# Patient Record
Sex: FEMALE | Race: OTHER
[De-identification: names, ages, dates, MRNs, and addresses within clinical notes are randomized per-mention and may not be internally consistent; named-entity substitution may affect disease eponyms.]

---

## 2021-03-16 ENCOUNTER — HOSPITAL ENCOUNTER (OUTPATIENT)
Dept: HOSPITAL 72 - GAS | Age: 51
Discharge: HOME | End: 2021-03-16
Payer: COMMERCIAL

## 2021-03-16 VITALS — DIASTOLIC BLOOD PRESSURE: 76 MMHG | SYSTOLIC BLOOD PRESSURE: 127 MMHG

## 2021-03-16 VITALS — SYSTOLIC BLOOD PRESSURE: 107 MMHG | DIASTOLIC BLOOD PRESSURE: 68 MMHG

## 2021-03-16 VITALS — SYSTOLIC BLOOD PRESSURE: 117 MMHG | DIASTOLIC BLOOD PRESSURE: 76 MMHG

## 2021-03-16 VITALS — DIASTOLIC BLOOD PRESSURE: 70 MMHG | SYSTOLIC BLOOD PRESSURE: 110 MMHG

## 2021-03-16 VITALS — WEIGHT: 186 LBS | BODY MASS INDEX: 31.76 KG/M2 | HEIGHT: 64 IN

## 2021-03-16 VITALS — DIASTOLIC BLOOD PRESSURE: 74 MMHG | SYSTOLIC BLOOD PRESSURE: 135 MMHG

## 2021-03-16 VITALS — DIASTOLIC BLOOD PRESSURE: 79 MMHG | SYSTOLIC BLOOD PRESSURE: 130 MMHG

## 2021-03-16 VITALS — DIASTOLIC BLOOD PRESSURE: 54 MMHG | SYSTOLIC BLOOD PRESSURE: 117 MMHG

## 2021-03-16 VITALS — SYSTOLIC BLOOD PRESSURE: 144 MMHG | DIASTOLIC BLOOD PRESSURE: 81 MMHG

## 2021-03-16 DIAGNOSIS — Z88.6: ICD-10-CM

## 2021-03-16 DIAGNOSIS — K31.7: Primary | ICD-10-CM

## 2021-03-16 DIAGNOSIS — K21.9: ICD-10-CM

## 2021-03-16 DIAGNOSIS — E66.9: ICD-10-CM

## 2021-03-16 DIAGNOSIS — I10: ICD-10-CM

## 2021-03-16 PROCEDURE — 94003 VENT MGMT INPAT SUBQ DAY: CPT

## 2021-03-16 PROCEDURE — 94150 VITAL CAPACITY TEST: CPT

## 2021-03-16 PROCEDURE — 43251 EGD REMOVE LESION SNARE: CPT

## 2021-03-16 NOTE — ANETHESIA PREOPERATIVE EVAL
Anesthesia Pre-op PMH/ROS


General


Date of Evaluation:  Mar 16, 2021


Time of Evaluation:  09:17


Anesthesiologist:  Cyril


ASA Score:  ASA 3


Mallampati Score


Class I : Soft palate, uvula, fauces, pillars visible


Class II: Soft palate, uvula, fauces visible


Class III: Soft palate, base of uvula visible


Class IV: Only hard plate visible


Mallampati Classification:  Class II


Surgeon:  April


Diagnosis:  Abd Pain


Surgical Procedure:  EGD


Anesthesia History:  none


Family History:  no anesthesia problems


Allergies:  


Coded Allergies:  


     CODEINE (Verified  Allergy, Severe, rash, 3/16/21)


Medications:  see eMAR


Patient NPO?:  Yes





Past Medical History


Cardiovascular:  Reports: HTN


Pulmonary:  Reports: asthma


Gastrointestinal/Genitourinary:  Reports: GERD


Other:  obesity - BMI 33


PSxH Narrative:


L Knee Arthroscopy





Anesthesia Pre-op Phys. Exam


Physician Exam





Last Vital Signs








  Date Time  Temp Pulse Resp B/P (MAP) Pulse Ox O2 Delivery O2 Flow Rate FiO2


 


3/16/21 07:55      Room Air  


 


3/16/21 07:54 97.3 70 18 144/81 97   








Constitutional:  NAD


Neurologic:  CN 2-12 intact


Cardiovascular:  RRR


Respiratory:  CTA


Gastrointestinal:  S/NT/ND





Airway Exam


Mallampati Score:  Class II


MO:  full


ROM:  full


Teeth:  missing, intact





Anesthesia Pre-op A/P


Risk Assessment & Plan


Assessment:


ASA 3


Plan:


TIVA


Status Change Before Surgery:  No











Paul Nam MD                Mar 16, 2021 08:38

## 2021-03-16 NOTE — ENDOSCOPY PROCEDURE NOTE
Endoscopy Procedure Note


General


Indication for Procedure:  GERDs and abdominal pains


Procedures Performed:  EGD - evidence of gastric polyposis. Two polyps removed 

from mid body and prepyloric area. The mid body polyps was 1 CM and 

pedunclulated


Specimen:  yes


Pt Tolerated Procedure Well:  Yes


Estimated Blood Loss:  none





Anesthesia


Anesthesiologist:  Dr. Martin


Anesthesia:  moderate sedation





Medications


Medication Given:  see anesthesia record





Inserted Devices


Implant(s) used?:  No





Quality


Quality of Bowel Preparation:  Good


Was there any complications?:  No





GI Core Measures


50 yrs or older w/o bx or poly:  Not Applicable


10yrs. F/U recommended:  Not Applicable


If not recommended, why?:  


Med reason:<3 yrs.:  


System Reason:<3 yrs.:  











Joanne Chen MD                 Mar 16, 2021 09:42

## 2021-03-16 NOTE — OPERATIVE NOTE - DICTATED
DATE OF OPERATION:  03/16/2021

SURGEON:  Joanne Chen MD.



PROCEDURE:  Esophagogastroduodenoscopy with polypectomy and biopsy.



PREOPERATIVE DIAGNOSIS:  Abdominal pain, history of gastroesophageal acid

reflux.



POSTOPERATIVE DIAGNOSIS:  Evidence of gastric polyposis.  Two major polyps

were removed with hot snare, one located in the midbody posterior wall of

the stomach and size of 1 cm, pedunculated and the other one in the

pre-pyloric area, size of 5 mm, also pedunculated.  The gastric cavity

seems to be completely normal.  No evidence of gastritis noted.  Random

biopsy from gastric body obtained.



MEDICATION USED:  Per Dr. Nam anesthesiologist.



INSTRUMENT:  GIF Olympus video upper GI endoscope.



DESCRIPTION OF PROCEDURE:  The patient after arriving endoscopy unit, was

told about risks and benefits of the procedure, which he accepted and

signed informed consent.  He was then put on the left lateral decubitus

position.  After adequate IV sedation, the scope was gently passed through

the cricopharyngeal area, was lodged into the upper esophagus and was

gradually advanced towards gastroesophageal junction.  The entire length

of the esophagus was normal.  GE junction also looked normal.  No evidence

of Reid's or hiatal hernia.



At this time, the scope was advanced into the stomach and the gastric

cavity was distended with insufflation of air.  Immediately, it was seen

that there was rather a large pedunculated polyp located over the midbody

posterior wall greater curvature side of the stomach.  It looked benign

however it was multilobulated and it was grabbed with a snare cautery

forceps and totally coagulated and removed and the polypectomy did not

reveal major bleeding either.  At this time, the scope was gradually

advanced towards the rest of the stomach into the antrum where there was

another approximately 4 to 5 mm polypoid lesion seen in the pre-pyloric

section at the level of 2 o'clock.  This was again grabbed with a snare

cautery forceps and totally removed with hot snare and all the specimen

was sent to the pathology lab.  The rest of the stomach looked normal and

there was no any evidence of gastritis or ulcers, etc.  Subsequently, the

scope was passed through the pylorus.  First and second portion of

duodenum were examined that they looked completely normal.  At this time,

the scope was pulled out and the procedure was terminated.  The patient

tolerated the procedure well and left the endoscopy room in a good

condition.









  ______________________________________________

  Said ARABELLA Chen





DR:  SEJAL

D:  03/16/2021 09:47

T:  03/16/2021 10:35

JOB#:  [id number]

CC:

## 2021-03-16 NOTE — 48 HOUR POST ANESTHESIA EVAL
Post Anesthesia Evaluation


Procedure:  EGD


Date of Evaluation:  Mar 16, 2021


Time of Evaluation:  12:12


Blood Pressure Systolic:  139


0:  69


Pulse Rate:  66


Respiratory Rate:  18


Temperature (Fahrenheit):  98.6


O2 Sat by Pulse Oximetry:  100


Airway:  patent


Nausea:  No


Vomiting:  No


Pain Intensity:  1


Hydration Status:  adequate


Cardiopulmonary Status:


Stable


Mental Status/LOC:  patient returned to baseline


Follow-up Care/Observations:


0


Post-Anesthesia Complications:


0


Follow-up care needed:  ready to discharge











Paul Nam MD                Mar 16, 2021 08:39

## 2021-03-16 NOTE — IMMEDIATE POST-OP EVALUATION
Immediate Post-Op Evalulation


Immediate Post-Op Evalulation


Procedure:  EGD


Date of Evaluation:  Mar 16, 2021


Time of Evaluation:  10:04


IV Fluids:  600 LR


Blood Products:  0


Estimated Blood Loss:  2


Urinary Output:  0


Blood Pressure Systolic:  117


Blood Pressure Diastolic:  68


Pulse Rate:  70


Respiratory Rate:  16


O2 Sat by Pulse Oximetry:  100


Temperature (Fahrenheit):  98.8


Pain Score (1-10):  1


Nausea:  No


Vomiting:  No


Complications


0


Patient Status:  awake, reacts, patent, none


Hydration Status:  adequate











Paul Nam MD                Mar 16, 2021 08:38

## 2021-03-16 NOTE — DISCHARGE INSTRUCTIONS
Discharge Instructions


Discharge Instructions


Follow up with:  No need to follow up in the office.





For Congestive Heart Failure


Reminder


Report to your physician any weight gain of 5 pounds or more in one week.











Joanne Chen MD                 Mar 16, 2021 08:03

## 2021-03-16 NOTE — PRE-PROCEDURE NOTE/ATTESTATION
Pre-Procedure Note/Attestation


Complete Prior to Procedure


Planned Procedure:  left


Procedure Narrative:


Examination of the upper GI tract via endoscopy and obtaining  biopsy.





Indications for Procedure


Pre-Operative Diagnosis:


R/O Gastritis/esophagitis.





Attestation


I attest that I discussed the nature of the procedure; its benefits; risks and 

complications; and alternatives (and the risks and benefits of such 

alternatives), prior to the procedure, with the patient (or the patient's legal 

representative).





I attest that, if there was a reasonable possibility of needing a blood 

transfusion, the patient (or the patient's legal representative) was given the 

[place] [employer] standardized written summary, pursuant 

to the [place] Blood Safety Act ([place] Health and Safety Code # 1645, as 

amended).





I attest that I re-evaluated the patient just prior to the surgery and that 

there has been no change in the patient's H&P, except as documented below:











Joanne Chen MD                 Mar 16, 2021 08:03